# Patient Record
Sex: MALE | Race: WHITE
[De-identification: names, ages, dates, MRNs, and addresses within clinical notes are randomized per-mention and may not be internally consistent; named-entity substitution may affect disease eponyms.]

---

## 2021-06-01 ENCOUNTER — HOSPITAL ENCOUNTER (EMERGENCY)
Dept: HOSPITAL 53 - M ED | Age: 21
Discharge: HOME | End: 2021-06-01
Payer: COMMERCIAL

## 2021-06-01 VITALS — DIASTOLIC BLOOD PRESSURE: 60 MMHG | SYSTOLIC BLOOD PRESSURE: 104 MMHG

## 2021-06-01 VITALS — WEIGHT: 185.39 LBS | BODY MASS INDEX: 27.46 KG/M2 | HEIGHT: 69 IN

## 2021-06-01 DIAGNOSIS — S20.219A: Primary | ICD-10-CM
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
## 2021-06-01 LAB
ALBUMIN SERPL BCG-MCNC: 4.3 GM/DL (ref 3.2–5.2)
ALT SERPL W P-5'-P-CCNC: 37 U/L (ref 12–78)
BASOPHILS # BLD AUTO: 0 10^3/UL (ref 0–0.2)
BASOPHILS NFR BLD AUTO: 0.4 % (ref 0–1)
BILIRUB CONJ SERPL-MCNC: 0.2 MG/DL (ref 0–0.2)
BILIRUB SERPL-MCNC: 0.7 MG/DL (ref 0.2–1)
CK MB CFR.DF SERPL CALC: 0.52
CK MB SERPL-MCNC: 2.2 NG/ML (ref ?–3.6)
CK SERPL-CCNC: 425 U/L (ref 39–308)
CRP SERPL-MCNC: < 0.3 MG/DL (ref 0–0.3)
EOSINOPHIL # BLD AUTO: 0.2 10^3/UL (ref 0–0.5)
EOSINOPHIL NFR BLD AUTO: 1.5 % (ref 0–3)
ERYTHROCYTE [SEDIMENTATION RATE] IN BLOOD BY WESTERGREN METHOD: 2 MM/HR (ref 0–15)
HCT VFR BLD AUTO: 48.1 % (ref 42–52)
HGB BLD-MCNC: 16.7 G/DL (ref 13.5–17.5)
LIPASE SERPL-CCNC: 85 U/L (ref 73–393)
LYMPHOCYTES # BLD AUTO: 1.9 10^3/UL (ref 1.5–5)
LYMPHOCYTES NFR BLD AUTO: 17.9 % (ref 24–44)
MCH RBC QN AUTO: 30.5 PG (ref 27–33)
MCHC RBC AUTO-ENTMCNC: 34.7 G/DL (ref 32–36.5)
MCV RBC AUTO: 87.9 FL (ref 80–96)
MONOCYTES # BLD AUTO: 1 10^3/UL (ref 0–0.8)
MONOCYTES NFR BLD AUTO: 9.5 % (ref 2–8)
NEUTROPHILS # BLD AUTO: 7.3 10^3/UL (ref 1.5–8.5)
NEUTROPHILS NFR BLD AUTO: 70.1 % (ref 36–66)
NT-PRO BNP: 7 PG/ML (ref ?–125)
PLATELET # BLD AUTO: 243 10^3/UL (ref 150–450)
PROT SERPL-MCNC: 7.5 GM/DL (ref 6.4–8.2)
RBC # BLD AUTO: 5.47 10^6/UL (ref 4.3–6.1)
TROPONIN I SERPL-MCNC: < 0.02 NG/ML (ref ?–0.1)
TSH SERPL DL<=0.005 MIU/L-ACNC: 2.08 UIU/ML (ref 0.46–3.98)
WBC # BLD AUTO: 10.3 10^3/UL (ref 4–10)

## 2021-06-01 PROCEDURE — 36415 COLL VENOUS BLD VENIPUNCTURE: CPT

## 2021-06-01 PROCEDURE — 80076 HEPATIC FUNCTION PANEL: CPT

## 2021-06-01 PROCEDURE — 84484 ASSAY OF TROPONIN QUANT: CPT

## 2021-06-01 PROCEDURE — 83880 ASSAY OF NATRIURETIC PEPTIDE: CPT

## 2021-06-01 PROCEDURE — 99285 EMERGENCY DEPT VISIT HI MDM: CPT

## 2021-06-01 PROCEDURE — 94760 N-INVAS EAR/PLS OXIMETRY 1: CPT

## 2021-06-01 PROCEDURE — 86140 C-REACTIVE PROTEIN: CPT

## 2021-06-01 PROCEDURE — 82553 CREATINE MB FRACTION: CPT

## 2021-06-01 PROCEDURE — 85652 RBC SED RATE AUTOMATED: CPT

## 2021-06-01 PROCEDURE — 93041 RHYTHM ECG TRACING: CPT

## 2021-06-01 PROCEDURE — 84443 ASSAY THYROID STIM HORMONE: CPT

## 2021-06-01 PROCEDURE — 85025 COMPLETE CBC W/AUTO DIFF WBC: CPT

## 2021-06-01 PROCEDURE — 71275 CT ANGIOGRAPHY CHEST: CPT

## 2021-06-01 PROCEDURE — 93005 ELECTROCARDIOGRAM TRACING: CPT

## 2021-06-01 PROCEDURE — 96374 THER/PROPH/DIAG INJ IV PUSH: CPT

## 2021-06-01 PROCEDURE — 80047 BASIC METABLC PNL IONIZED CA: CPT

## 2021-06-01 PROCEDURE — 83690 ASSAY OF LIPASE: CPT

## 2021-06-01 PROCEDURE — 71046 X-RAY EXAM CHEST 2 VIEWS: CPT

## 2021-06-01 PROCEDURE — 82550 ASSAY OF CK (CPK): CPT

## 2021-06-01 NOTE — REP
INDICATION:

trauma



COMPARISON:

None.



TECHNIQUE:

PA and lateral.



FINDINGS:

The mediastinum and cardiac silhouette are normal.  The lung fields are clear and

without acute consolidation, effusion, or pneumothorax.  The skeletal structures are

intact and normal.



IMPRESSION:

No acute cardiopulmonary process.





<Electronically signed by Zachariah Dugan > 06/01/21 1718

## 2021-06-01 NOTE — REP
INDICATION:

blunt chest trauma with syncope; r/o cardiac contusion



COMPARISON:

None.



TECHNIQUE:

Axial contrast enhanced images from the thoracic inlet to the upper abdomen using

pulmonary embolus technique with multiplanar re-formations.  75 ml Isovue 370

intravenous contrast material administered without complication.



This CT examination was performed using the following dose reduction techniques:

Automated exposure control, adjustment of mA and/or kv according to the patient's

size, and use of iterative reconstruction technique.





FINDINGS:

Satisfactory enhancement of the pulmonary vasculature is achieved and no filling

defects are identified to suggest pulmonary embolus.  Further evaluation of the

mediastinum demonstrates normal thoracic aorta, heart and pericardium.  The bilateral

lung fields are well aerated and clear without consolidation pleural effusion or

pneumothorax.  Tracheobronchial tree is patent.  No nodule or mass lesion is

identified.  No adenopathy noted.  Surrounding musculoskeletal structures intact



IMPRESSION:

No evidence for pulmonary embolus.

No acute mediastinal or pleural parenchymal process.





<Electronically signed by Zachariah Dugan > 06/01/21 [id number]

## 2021-06-02 NOTE — ECGEPIP
[facility] - ED

                                       

                                       Test Date:    2021

Pat Name:     JOSE D RIVERA               Department:   

Patient ID:   [id number]                 Room:         -

Gender:       Male                     Technician:   LR

:          2000               Requested By: Maja Lundborg-Gray 

Order Number: [id number]     Reading MD:   Love Norris

                                 Measurements

Intervals                              Axis          

Rate:         72                       P:            74

KY:           196                      QRS:          65

QRSD:         96                       T:            40

QT:           368                                    

QTc:          402                                    

                           Interpretive Statements

Normal sinus rhythm

Possible Acute pericarditis vs early repolarization

No prior

Electronically Signed on 2021 20:34:50 EDT by Love Norris